# Patient Record
Sex: FEMALE | Race: WHITE | Employment: UNEMPLOYED | URBAN - METROPOLITAN AREA
[De-identification: names, ages, dates, MRNs, and addresses within clinical notes are randomized per-mention and may not be internally consistent; named-entity substitution may affect disease eponyms.]

---

## 2017-12-29 ENCOUNTER — APPOINTMENT (OUTPATIENT)
Dept: GENERAL RADIOLOGY | Age: 32
End: 2017-12-29
Payer: MEDICAID

## 2017-12-29 ENCOUNTER — HOSPITAL ENCOUNTER (EMERGENCY)
Age: 32
Discharge: HOME | End: 2017-12-29
Payer: MEDICAID

## 2017-12-29 VITALS
HEART RATE: 81 BPM | BODY MASS INDEX: 25.11 KG/M2 | HEIGHT: 67 IN | TEMPERATURE: 98 F | SYSTOLIC BLOOD PRESSURE: 142 MMHG | WEIGHT: 160 LBS | RESPIRATION RATE: 22 BRPM | DIASTOLIC BLOOD PRESSURE: 79 MMHG | OXYGEN SATURATION: 100 %

## 2017-12-29 DIAGNOSIS — K59.03 DRUG-INDUCED CONSTIPATION: Primary | ICD-10-CM

## 2017-12-29 LAB
AMORPHOUS: ABNORMAL
BACTERIA: ABNORMAL /HPF
BILIRUBIN URINE: NEGATIVE
BLOOD, URINE: ABNORMAL
CLARITY: ABNORMAL
COLOR: YELLOW
EPITHELIAL CELLS, UA: ABNORMAL /HPF
GLUCOSE URINE: NEGATIVE MG/DL
KETONES, URINE: NEGATIVE MG/DL
LEUKOCYTE ESTERASE, URINE: ABNORMAL
MUCUS: PRESENT
NITRITE, URINE: NEGATIVE
PH UA: 8 (ref 5–9)
PROTEIN UA: NEGATIVE MG/DL
RBC UA: ABNORMAL /HPF (ref 0–2)
RENAL EPITHELIAL, UA: ABNORMAL /HPF
SPECIFIC GRAVITY UA: 1.01 (ref 1–1.03)
URINE REFLEX TO CULTURE: YES
UROBILINOGEN, URINE: 0.2 E.U./DL
WBC UA: ABNORMAL /HPF (ref 0–5)

## 2017-12-29 PROCEDURE — 87086 URINE CULTURE/COLONY COUNT: CPT

## 2017-12-29 PROCEDURE — 96372 THER/PROPH/DIAG INJ SC/IM: CPT

## 2017-12-29 PROCEDURE — 81001 URINALYSIS AUTO W/SCOPE: CPT

## 2017-12-29 PROCEDURE — 99283 EMERGENCY DEPT VISIT LOW MDM: CPT

## 2017-12-29 PROCEDURE — 74022 RADEX COMPL AQT ABD SERIES: CPT

## 2017-12-29 PROCEDURE — 6360000002 HC RX W HCPCS: Performed by: PERSONAL EMERGENCY RESPONSE ATTENDANT

## 2017-12-29 RX ORDER — DICYCLOMINE HYDROCHLORIDE 10 MG/ML
20 INJECTION INTRAMUSCULAR ONCE
Status: COMPLETED | OUTPATIENT
Start: 2017-12-29 | End: 2017-12-29

## 2017-12-29 RX ORDER — BUPRENORPHINE HYDROCHLORIDE AND NALOXONE HYDROCHLORIDE DIHYDRATE 8; 2 MG/1; MG/1
1 TABLET SUBLINGUAL DAILY
COMMUNITY

## 2017-12-29 RX ORDER — GLYCERIN PEDIATRIC
1 SUPPOSITORY, RECTAL RECTAL ONCE
Qty: 6 SUPPOSITORY | Refills: 0 | Status: SHIPPED | OUTPATIENT
Start: 2017-12-29 | End: 2017-12-29

## 2017-12-29 RX ORDER — LUBIPROSTONE 24 UG/1
24 CAPSULE, GELATIN COATED ORAL 2 TIMES DAILY WITH MEALS
Qty: 30 CAPSULE | Refills: 0 | Status: SHIPPED | OUTPATIENT
Start: 2017-12-29 | End: 2020-07-08

## 2017-12-29 RX ADMIN — DICYCLOMINE HYDROCHLORIDE 20 MG: 20 INJECTION, SOLUTION INTRAMUSCULAR at 05:18

## 2017-12-29 RX ADMIN — METHYLNALTREXONE BROMIDE 12 MG: 12 INJECTION, SOLUTION SUBCUTANEOUS at 05:18

## 2017-12-29 ASSESSMENT — ENCOUNTER SYMPTOMS
COUGH: 0
RHINORRHEA: 0
NAUSEA: 1
BLOOD IN STOOL: 0
SHORTNESS OF BREATH: 0
DIARRHEA: 0
COLOR CHANGE: 0
VOMITING: 0
ABDOMINAL PAIN: 0
BACK PAIN: 1

## 2017-12-29 ASSESSMENT — PAIN DESCRIPTION - DESCRIPTORS: DESCRIPTORS: CRAMPING;SHOOTING;SHARP;RADIATING

## 2017-12-29 ASSESSMENT — PAIN DESCRIPTION - LOCATION: LOCATION: BACK

## 2017-12-29 ASSESSMENT — PAIN SCALES - GENERAL: PAINLEVEL_OUTOF10: 10

## 2017-12-29 NOTE — ED PROVIDER NOTES
[address] [facility] ED  eMERGENCY dEPARTMENT eNCOUnter      Pt Name: Bertha Oliva  MRN: [id number]  [place] 1985  Date of evaluation: 12/29/2017  Provider: Prosper Butler, [address] [place] [address] Hoehne is a 29 y.o. female presents to the emergency department with lower back pain. Patient states yesterday she started experiencing lower back pain which has gradually been getting worse. She states in the past when she gets constipated she gets this pain. Patient states her last bowel movement was prior to [place]. Patient is on Suboxone for history of opiate abuse. She has been nauseous with no vomiting. She did MiraLAX with no relief. She denies fevers, diarrhea, urinary symptoms. She denies any history of bowel surgeries. She has mild generalized abdominal discomfort    HPI    Nursing Notes were reviewed. REVIEW OF SYSTEMS       Review of Systems   Constitutional: Negative for appetite change and fever. HENT: Negative for congestion, drooling and rhinorrhea. Respiratory: Negative for cough and shortness of breath. Cardiovascular: Negative for chest pain. Gastrointestinal: Positive for nausea. Negative for abdominal pain, blood in stool, diarrhea and vomiting. Genitourinary: Negative for difficulty urinating. Musculoskeletal: Positive for back pain. Negative for neck stiffness. Skin: Negative for color change and rash. Neurological: Negative for dizziness, syncope, light-headedness, numbness and headaches. PAST MEDICAL HISTORY     Past Medical History:   Diagnosis Date    Allergic rhinitis     Hip pain, bilateral     Hypothyroidism     Low back pain     Restless legs syndrome     Thyroid disease     Trochanteric bursitis          SURGICAL HISTORY     No past surgical history on file.       CURRENT MEDICATIONS       Previous Medications    BUPRENORPHINE-NALOXONE (SUBOXONE) 8-2 MG SUBL SL TABLET    Place 1 tablet under the tongue

## 2017-12-30 LAB — URINE CULTURE, ROUTINE: NORMAL

## 2020-07-08 ENCOUNTER — HOSPITAL ENCOUNTER (EMERGENCY)
Age: 35
Discharge: LEFT BEFORE DISCHARGE | End: 2020-07-08
Payer: MEDICAID

## 2020-07-08 ENCOUNTER — APPOINTMENT (OUTPATIENT)
Dept: GENERAL RADIOLOGY | Age: 35
End: 2020-07-08
Payer: MEDICAID

## 2020-07-08 VITALS
OXYGEN SATURATION: 100 % | SYSTOLIC BLOOD PRESSURE: 133 MMHG | WEIGHT: 215 LBS | DIASTOLIC BLOOD PRESSURE: 88 MMHG | HEIGHT: 67 IN | TEMPERATURE: 98.4 F | BODY MASS INDEX: 33.74 KG/M2 | HEART RATE: 105 BPM | RESPIRATION RATE: 20 BRPM

## 2020-07-08 PROCEDURE — 6370000000 HC RX 637 (ALT 250 FOR IP): Performed by: PERSONAL EMERGENCY RESPONSE ATTENDANT

## 2020-07-08 PROCEDURE — 71101 X-RAY EXAM UNILAT RIBS/CHEST: CPT

## 2020-07-08 PROCEDURE — 99284 EMERGENCY DEPT VISIT MOD MDM: CPT

## 2020-07-08 RX ORDER — IBUPROFEN 800 MG/1
800 TABLET ORAL EVERY 8 HOURS PRN
Qty: 30 TABLET | Refills: 0 | Status: SHIPPED | OUTPATIENT
Start: 2020-07-08

## 2020-07-08 RX ORDER — TIZANIDINE 4 MG/1
4 TABLET ORAL EVERY 6 HOURS PRN
Qty: 15 TABLET | Refills: 0 | Status: SHIPPED | OUTPATIENT
Start: 2020-07-08

## 2020-07-08 RX ORDER — IBUPROFEN 800 MG/1
800 TABLET ORAL ONCE
Status: COMPLETED | OUTPATIENT
Start: 2020-07-08 | End: 2020-07-08

## 2020-07-08 RX ADMIN — IBUPROFEN 800 MG: 800 TABLET ORAL at 02:36

## 2020-07-08 ASSESSMENT — ENCOUNTER SYMPTOMS
BLOOD IN STOOL: 0
SORE THROAT: 0
NAUSEA: 0
DIARRHEA: 0
RHINORRHEA: 0
ABDOMINAL PAIN: 0
COUGH: 0
BACK PAIN: 1
SHORTNESS OF BREATH: 0
COLOR CHANGE: 0
VOMITING: 0

## 2020-07-08 ASSESSMENT — PAIN DESCRIPTION - ORIENTATION
ORIENTATION: LEFT
ORIENTATION: LEFT

## 2020-07-08 ASSESSMENT — PAIN DESCRIPTION - FREQUENCY: FREQUENCY: CONTINUOUS

## 2020-07-08 ASSESSMENT — PAIN SCALES - GENERAL
PAINLEVEL_OUTOF10: 7

## 2020-07-08 ASSESSMENT — PAIN DESCRIPTION - LOCATION
LOCATION: RIB CAGE
LOCATION: RIB CAGE

## 2020-07-08 ASSESSMENT — PAIN DESCRIPTION - DESCRIPTORS: DESCRIPTORS: SHARP;STABBING

## 2020-07-08 ASSESSMENT — PAIN DESCRIPTION - PAIN TYPE
TYPE: ACUTE PAIN
TYPE: ACUTE PAIN

## 2020-07-08 NOTE — ED NOTES
Nurse went to room to given d/c papers. Pt was not in room, bathroom was checked and pt was not present in restroom.       Ariel Leblanc RN  07/08/20 [id number]

## 2020-07-08 NOTE — ED TRIAGE NOTES
Pt arrives with c/o left sided rib pain x 2 days. Pt states she was swimming on 7/5 and thinks she over extended and pulled a muscle. Pt describes pain as spasms, pt crying. Lung sounds clear and present in all fields. Pt alert and oriented x 4. Skin pink, warm, dry. Pt splinting with pillow. Pt denies chest pain.   States pain is worse with deep inspiration

## 2020-07-08 NOTE — ED PROVIDER NOTES
[address] [facility] ED  eMERGENCY dEPARTMENT eNCOUnter      Pt Name: Manuel Camejo  MRN: [id number]  [place] 1985  Date of evaluation: 7/8/2020  Provider: DEWAYNE Melchor      HISTORY OF PRESENT Shayne Gonzales is a 30 y.o. female with PMHx of allergic rhinitis, hypothyroidism, RLS presents to the emergency department with left sided rib pain. Pt states July 4th she was pushing her kid around in a pool and a floatie and remembers pushing off a wall to push her, and felt a pull in her left side. The next day she started with left-sided back and rib pain, worse with a deep breath and movement. She denies fevers, cough, chest pain, shortness of breath, abdominal pain, nausea, vomiting, diarrhea, urinary symptoms. HPI    Nursing Notes were reviewed. REVIEW OF SYSTEMS       Review of Systems   Constitutional: Negative for appetite change, chills and fever. HENT: Negative for congestion, rhinorrhea and sore throat. Respiratory: Negative for cough and shortness of breath. Cardiovascular: Negative for chest pain. Gastrointestinal: Negative for abdominal pain, blood in stool, diarrhea, nausea and vomiting. Genitourinary: Negative for difficulty urinating. Musculoskeletal: Positive for back pain. Negative for neck stiffness. Skin: Negative for color change and rash. Neurological: Negative for dizziness, syncope, weakness, light-headedness, numbness and headaches. All other systems reviewed and are negative. PAST MEDICAL HISTORY     Past Medical History:   Diagnosis Date    Allergic rhinitis     Hip pain, bilateral     Hypothyroidism     Low back pain     Restless legs syndrome     Thyroid disease     Trochanteric bursitis          SURGICAL HISTORY     History reviewed. No pertinent surgical history. CURRENT MEDICATIONS       Previous Medications    BUPRENORPHINE-NALOXONE (SUBOXONE) 8-2 MG SUBL SL TABLET    Place 1 tablet under the tongue daily. LEVOTHYROXINE (SYNTHROID) 50 MCG TABLET    Take 1 tablet by mouth Daily. ALLERGIES     Patient has no known allergies.     FAMILY HISTORY       Family History   Problem Relation Age of Onset    Diabetes Father     Hypertension Father     Cancer Father         liver    Hypertension Mother     Heart Disease Mother     Diabetes Mother           SOCIAL HISTORY       Social History     Socioeconomic History    Marital status: Single     Spouse name: None    Number of children: None    Years of education: None    Highest education level: None   Occupational History    None   Social Needs    Financial resource strain: None    Food insecurity     Worry: None     Inability: None    Transportation needs     Medical: None     Non-medical: None   Tobacco Use    Smoking status: Current Every Day Smoker     Packs/day: 0.50     Years: 2.00     Pack years: 1.00     Last attempt to quit: 10/9/2011     Years since quittin.7    Smokeless tobacco: Never Used   Substance and Sexual Activity    Alcohol use: No    Drug use: Yes     Types: Opiates      Comment: clean for 1 year    Sexual activity: None   Lifestyle    Physical activity     Days per week: None     Minutes per session: None    Stress: None   Relationships    Social connections     Talks on phone: None     Gets together: None     Attends [employer] service: None     Active member of club or organization: None     Attends meetings of clubs or organizations: None     Relationship status: None    Intimate partner violence     Fear of current or ex partner: None     Emotionally abused: None     Physically abused: None     Forced sexual activity: None   Other Topics Concern    None   Social History Narrative    None         PHYSICAL EXAM         ED Triage Vitals [20 0221]   BP Temp Temp Source Pulse Resp SpO2 Height Weight   133/88 98.4 °F (36.9 °C) Oral 105 20 100 % 5' 7\" (1.702 m) 215 lb (97.5 kg)       Physical Exam  Constitutional: Appearance: She is well-developed. HENT:      Head: Normocephalic and atraumatic. Eyes:      Conjunctiva/sclera: Conjunctivae normal.      Pupils: Pupils are equal, round, and reactive to light. Neck:      Musculoskeletal: Normal range of motion and neck supple. Trachea: No tracheal deviation. Cardiovascular:      Heart sounds: Normal heart sounds. Pulmonary:      Effort: Pulmonary effort is normal. No respiratory distress. Breath sounds: Normal breath sounds. No stridor. Abdominal:      General: Bowel sounds are normal. There is no distension. Palpations: Abdomen is soft. There is no mass. Tenderness: There is no abdominal tenderness. There is no guarding or rebound. Musculoskeletal: Normal range of motion. General: Tenderness present. Back:       Comments: Patient is mildly tender to palpate over left thoracic paraspinal muscles. No CVA tenderness, ecchymosis, step-offs, signs of trauma   Skin:     General: Skin is warm and dry. Capillary Refill: Capillary refill takes less than 2 seconds. Findings: No rash. Neurological:      Mental Status: She is alert and oriented to person, place, and time. Deep Tendon Reflexes: Reflexes are normal and symmetric. Psychiatric:         Behavior: Behavior normal.         Thought Content:  Thought content normal.         Judgment: Judgment normal.         DIAGNOSTIC RESULTS     EKG:All EKG's are interpreted by the Emergency Department Physician who either signs or Co-signs this chart in the absence of a cardiologist.        RADIOLOGY:   Non-plain film images such as CT, Ultrasound and MRI are read by theradiologist. Plain radiographic images are visualized and preliminarily interpreted by the emergency physician with the below findings:    Interpretation per theRadiologist below, if available at the time of this note:    XR RIBS LEFT INCLUDE CHEST (MIN 3 VIEWS)    (Results Pending)           LABS:  Labs Reviewed - No data to display    All other labs were within normal range or not returned as of this dictation. EMERGENCY DEPARTMENT COURSE and DIFFERENTIAL DIAGNOSIS/MDM:   Vitals:    Vitals:    07/08/20 0221   BP: 133/88   Pulse: 105   Resp: 20   Temp: 98.4 °F (36.9 °C)   TempSrc: Oral   SpO2: 100%   Weight: 215 lb (97.5 kg)   Height: 5' 7\" (1.702 m)         MDM    X-ray shows no pneumothorax or obvious rib fractures. I do feel muscle strain is present at this time. She was given Motrin for pain and is starting to get some relief. She will be sent home with Motrin and Zanaflex. Aware to apply ice/heat to the area. Standard anticipatory guidance given to patient upon discharge. Have given them a specific time frame in which to follow-up and who to follow-up with. I have also advised them that they should return to the emergency department if they get worse, or not getting better or develop any new or concerning symptoms. Patient demonstrates understanding. CRITICAL CARE TIME   Total Critical Caretime was 0 minutes, excluding separately reportable procedures. There was a high probability of clinically significant/life threatening deterioration in the patient's condition which required my urgent intervention. Procedures    FINAL IMPRESSION      1.  Back strain, initial encounter          DISPOSITION/PLAN   DISPOSITION Decision To Discharge 07/08/2020 03:16:42 AM      PATIENT REFERRED TO:  [facility]  [address] [place] [id number]  [phone]  In 3 days        DISCHARGE MEDICATIONS:  New Prescriptions    IBUPROFEN (ADVIL;MOTRIN) 800 MG TABLET    Take 1 tablet by mouth every 8 hours as needed for Pain    TIZANIDINE (ZANAFLEX) 4 MG TABLET    Take 1 tablet by mouth every 6 hours as needed (pain)          (Please notethat portions of this note were completed with a voice recognition program.  Efforts were made to edit the dictations but occasionally words are mis-transcribed. )    DEWAYNE Mathias (electronically signed)  Emergency Physician Assistant         [place]  02[birth year] [id number]

## 2025-04-18 ENCOUNTER — OFFICE VISIT (OUTPATIENT)
Age: 40
End: 2025-04-18
Payer: MEDICAID

## 2025-04-18 VITALS
SYSTOLIC BLOOD PRESSURE: 129 MMHG | HEART RATE: 99 BPM | DIASTOLIC BLOOD PRESSURE: 77 MMHG | WEIGHT: 240 LBS | HEIGHT: 67 IN | BODY MASS INDEX: 37.67 KG/M2

## 2025-04-18 DIAGNOSIS — E66.9 OBESITY (BMI 30-39.9): ICD-10-CM

## 2025-04-18 DIAGNOSIS — E78.2 MIXED HYPERLIPIDEMIA: ICD-10-CM

## 2025-04-18 DIAGNOSIS — E03.9 HYPOTHYROIDISM (ACQUIRED): Primary | ICD-10-CM

## 2025-04-18 DIAGNOSIS — R61 HYPERHIDROSIS: ICD-10-CM

## 2025-04-18 PROCEDURE — G8427 DOCREV CUR MEDS BY ELIG CLIN: HCPCS | Performed by: INTERNAL MEDICINE

## 2025-04-18 PROCEDURE — 99203 OFFICE O/P NEW LOW 30 MIN: CPT | Performed by: INTERNAL MEDICINE

## 2025-04-18 PROCEDURE — 99202 OFFICE O/P NEW SF 15 MIN: CPT | Performed by: INTERNAL MEDICINE

## 2025-04-18 PROCEDURE — G8417 CALC BMI ABV UP PARAM F/U: HCPCS | Performed by: INTERNAL MEDICINE

## 2025-04-18 PROCEDURE — 4004F PT TOBACCO SCREEN RCVD TLK: CPT | Performed by: INTERNAL MEDICINE

## 2025-04-18 RX ORDER — BUPRENORPHINE HYDROCHLORIDE AND NALOXONE HYDROCHLORIDE 5.7; 1.4 MG/1; MG/1
TABLET, ORALLY DISINTEGRATING SUBLINGUAL
COMMUNITY

## 2025-04-18 RX ORDER — LEVOTHYROXINE SODIUM 150 UG/1
150 TABLET ORAL DAILY
Qty: 30 TABLET | Refills: 5 | Status: SHIPPED | OUTPATIENT
Start: 2025-04-18

## 2025-04-18 RX ORDER — MELOXICAM 15 MG/1
15 TABLET ORAL DAILY
COMMUNITY
Start: 2025-04-15

## 2025-04-21 DIAGNOSIS — E03.9 HYPOTHYROIDISM (ACQUIRED): ICD-10-CM

## 2025-04-21 LAB
ANION GAP SERPL CALCULATED.3IONS-SCNC: 12 MEQ/L (ref 9–15)
BUN SERPL-MCNC: 10 MG/DL (ref 6–20)
CALCIUM SERPL-MCNC: 9.4 MG/DL (ref 8.5–9.9)
CHLORIDE SERPL-SCNC: 102 MEQ/L (ref 95–107)
CHOLEST SERPL-MCNC: 166 MG/DL (ref 0–199)
CO2 SERPL-SCNC: 22 MEQ/L (ref 20–31)
CREAT SERPL-MCNC: 0.7 MG/DL (ref 0.5–0.9)
GLUCOSE SERPL-MCNC: 103 MG/DL (ref 70–99)
HDLC SERPL-MCNC: 40 MG/DL (ref 40–59)
LDLC SERPL CALC-MCNC: 115 MG/DL (ref 0–129)
POTASSIUM SERPL-SCNC: 4.2 MEQ/L (ref 3.4–4.9)
SODIUM SERPL-SCNC: 136 MEQ/L (ref 135–144)
T4 FREE SERPL-MCNC: 0.82 NG/DL (ref 0.84–1.68)
TRIGL SERPL-MCNC: 57 MG/DL (ref 0–150)
TSH REFLEX: 42.04 UIU/ML (ref 0.44–3.86)

## 2025-04-23 LAB
THYROGLOB IGG SER-ACNC: 72 IU/ML (ref 0–40)
THYROPEROXIDASE IGG SERPL-ACNC: 456 IU/ML (ref 0–25)

## 2025-04-24 LAB — MIS SERPL-MCNC: 6.03 NG/ML (ref 0.18–11.71)

## 2025-05-19 ENCOUNTER — OFFICE VISIT (OUTPATIENT)
Age: 40
End: 2025-05-19
Payer: MEDICAID

## 2025-05-19 VITALS
DIASTOLIC BLOOD PRESSURE: 67 MMHG | OXYGEN SATURATION: 98 % | HEIGHT: 67 IN | BODY MASS INDEX: 37.51 KG/M2 | WEIGHT: 239 LBS | SYSTOLIC BLOOD PRESSURE: 117 MMHG | HEART RATE: 75 BPM

## 2025-05-19 DIAGNOSIS — E66.9 OBESITY (BMI 30-39.9): ICD-10-CM

## 2025-05-19 DIAGNOSIS — R61 HYPERHIDROSIS: ICD-10-CM

## 2025-05-19 DIAGNOSIS — E06.3 HASHIMOTO'S THYROIDITIS: ICD-10-CM

## 2025-05-19 DIAGNOSIS — E03.9 HYPOTHYROIDISM (ACQUIRED): Primary | ICD-10-CM

## 2025-05-19 PROCEDURE — G8417 CALC BMI ABV UP PARAM F/U: HCPCS | Performed by: INTERNAL MEDICINE

## 2025-05-19 PROCEDURE — 99213 OFFICE O/P EST LOW 20 MIN: CPT | Performed by: INTERNAL MEDICINE

## 2025-05-19 PROCEDURE — 99214 OFFICE O/P EST MOD 30 MIN: CPT | Performed by: INTERNAL MEDICINE

## 2025-05-19 PROCEDURE — 4004F PT TOBACCO SCREEN RCVD TLK: CPT | Performed by: INTERNAL MEDICINE

## 2025-05-19 PROCEDURE — G8427 DOCREV CUR MEDS BY ELIG CLIN: HCPCS | Performed by: INTERNAL MEDICINE

## 2025-05-19 RX ORDER — GLYCOPYRROLATE 1 MG/1
1 TABLET ORAL 3 TIMES DAILY
Qty: 90 TABLET | Refills: 3 | Status: SHIPPED | OUTPATIENT
Start: 2025-05-19

## 2025-05-19 NOTE — PROGRESS NOTES
5/19/2025    Assessment:       Diagnosis Orders   1. Hypothyroidism (acquired)        2. Hyperhidrosis        3. Hashimoto's thyroiditis        4. Obesity (BMI 30-39.9)              PLAN:     Orders Placed This Encounter   Procedures    T4, Free     Standing Status:   Future     Expected Date:   5/19/2025     Expiration Date:   5/19/2026    TSH reflex to FT4     Standing Status:   Future     Expected Date:   5/19/2025     Expiration Date:   5/19/2026     Orders Placed This Encounter   Medications    glycopyrrolate (ROBINUL) 1 MG tablet     Sig: Take 1 tablet by mouth 3 times daily     Dispense:  90 tablet     Refill:  3       Continue patient on Synthroid 150 mcg daily labs to be done in 2 months also started on glycopyrrolate side effects discussed educated about Hashimoto thyroiditis need for long-term thyroid replacement therapy  Follow-up in 2 months time patient also to start on compounding semaglutide 0.3 mg 1 to 50% of 30 dispensed patient education counseling  Subjective:     Chief Complaint   Patient presents with    Hypothyroidism     Test results     Hyperlipidemia    Excessive Sweating     Wants to start medication    Obesity     Lost 1lbs since 4-18-25     Vitals:    05/19/25 1143   BP: 117/67   Pulse: 75   SpO2: 98%   Weight: 108.4 kg (239 lb)   Height: 1.702 m (5' 7\")     Wt Readings from Last 3 Encounters:   05/19/25 108.4 kg (239 lb)   04/18/25 108.9 kg (240 lb)   07/08/20 97.5 kg (215 lb)     BP Readings from Last 3 Encounters:   05/19/25 117/67   04/18/25 129/77   07/08/20 133/88     4-week follow-up with hypothyroidism labs suspected increased thyroid antibodies consistent with Hashimoto thyroiditis patient educated regarding autoimmune nature of the disease patient also wants to start medication for weight loss BMI 37     Patient reviewed levothyroxine 150 mcg daily TSH was elevated free T4 was lowered patient has been off of thyroid medication for a few months    History of hyperhidrosis